# Patient Record
Sex: FEMALE | Race: WHITE | NOT HISPANIC OR LATINO | Employment: FULL TIME | RURAL
[De-identification: names, ages, dates, MRNs, and addresses within clinical notes are randomized per-mention and may not be internally consistent; named-entity substitution may affect disease eponyms.]

---

## 2018-09-13 ENCOUNTER — OFFICE VISIT (OUTPATIENT)
Dept: RETAIL CLINIC | Facility: CLINIC | Age: 23
End: 2018-09-13

## 2018-09-13 VITALS
HEIGHT: 64 IN | HEART RATE: 82 BPM | WEIGHT: 167 LBS | DIASTOLIC BLOOD PRESSURE: 84 MMHG | OXYGEN SATURATION: 97 % | TEMPERATURE: 98.2 F | SYSTOLIC BLOOD PRESSURE: 120 MMHG | BODY MASS INDEX: 28.51 KG/M2 | RESPIRATION RATE: 20 BRPM

## 2018-09-13 DIAGNOSIS — J06.9 ACUTE URI: Primary | ICD-10-CM

## 2018-09-13 LAB
EXPIRATION DATE: NORMAL
EXPIRATION DATE: NORMAL
FLUAV AG NPH QL: NEGATIVE
FLUBV AG NPH QL: NEGATIVE
INTERNAL CONTROL: NORMAL
INTERNAL CONTROL: NORMAL
Lab: NORMAL
Lab: NORMAL
S PYO AG THROAT QL: NEGATIVE

## 2018-09-13 PROCEDURE — 99213 OFFICE O/P EST LOW 20 MIN: CPT | Performed by: NURSE PRACTITIONER

## 2018-09-13 PROCEDURE — 87880 STREP A ASSAY W/OPTIC: CPT | Performed by: NURSE PRACTITIONER

## 2018-09-13 PROCEDURE — 87804 INFLUENZA ASSAY W/OPTIC: CPT | Performed by: NURSE PRACTITIONER

## 2018-09-13 NOTE — PATIENT INSTRUCTIONS
"Upper Respiratory Infection, Adult  Most upper respiratory infections (URIs) are a viral infection of the air passages leading to the lungs. A URI affects the nose, throat, and upper air passages. The most common type of URI is nasopharyngitis and is typically referred to as \"the common cold.\"  URIs run their course and usually go away on their own. Most of the time, a URI does not require medical attention, but sometimes a bacterial infection in the upper airways can follow a viral infection. This is called a secondary infection. Sinus and middle ear infections are common types of secondary upper respiratory infections.  Bacterial pneumonia can also complicate a URI. A URI can worsen asthma and chronic obstructive pulmonary disease (COPD). Sometimes, these complications can require emergency medical care and may be life threatening.  What are the causes?  Almost all URIs are caused by viruses. A virus is a type of germ and can spread from one person to another.  What increases the risk?  You may be at risk for a URI if:  · You smoke.  · You have chronic heart or lung disease.  · You have a weakened defense (immune) system.  · You are very young or very old.  · You have nasal allergies or asthma.  · You work in crowded or poorly ventilated areas.  · You work in health care facilities or schools.    What are the signs or symptoms?  Symptoms typically develop 2-3 days after you come in contact with a cold virus. Most viral URIs last 7-10 days. However, viral URIs from the influenza virus (flu virus) can last 14-18 days and are typically more severe. Symptoms may include:  · Runny or stuffy (congested) nose.  · Sneezing.  · Cough.  · Sore throat.  · Headache.  · Fatigue.  · Fever.  · Loss of appetite.  · Pain in your forehead, behind your eyes, and over your cheekbones (sinus pain).  · Muscle aches.    How is this diagnosed?  Your health care provider may diagnose a URI by:  · Physical exam.  · Tests to check that your " symptoms are not due to another condition such as:  ? Strep throat.  ? Sinusitis.  ? Pneumonia.  ? Asthma.    How is this treated?  A URI goes away on its own with time. It cannot be cured with medicines, but medicines may be prescribed or recommended to relieve symptoms. Medicines may help:  · Reduce your fever.  · Reduce your cough.  · Relieve nasal congestion.    Follow these instructions at home:  · Take medicines only as directed by your health care provider.  · Gargle warm saltwater or take cough drops to comfort your throat as directed by your health care provider.  · Use a warm mist humidifier or inhale steam from a shower to increase air moisture. This may make it easier to breathe.  · Drink enough fluid to keep your urine clear or pale yellow.  · Eat soups and other clear broths and maintain good nutrition.  · Rest as needed.  · Return to work when your temperature has returned to normal or as your health care provider advises. You may need to stay home longer to avoid infecting others. You can also use a face mask and careful hand washing to prevent spread of the virus.  · Increase the usage of your inhaler if you have asthma.  · Do not use any tobacco products, including cigarettes, chewing tobacco, or electronic cigarettes. If you need help quitting, ask your health care provider.  How is this prevented?  The best way to protect yourself from getting a cold is to practice good hygiene.  · Avoid oral or hand contact with people with cold symptoms.  · Wash your hands often if contact occurs.    There is no clear evidence that vitamin C, vitamin E, echinacea, or exercise reduces the chance of developing a cold. However, it is always recommended to get plenty of rest, exercise, and practice good nutrition.  Contact a health care provider if:  · You are getting worse rather than better.  · Your symptoms are not controlled by medicine.  · You have chills.  · You have worsening shortness of breath.  · You have  brown or red mucus.  · You have yellow or brown nasal discharge.  · You have pain in your face, especially when you bend forward.  · You have a fever.  · You have swollen neck glands.  · You have pain while swallowing.  · You have white areas in the back of your throat.  Get help right away if:  · You have severe or persistent:  ? Headache.  ? Ear pain.  ? Sinus pain.  ? Chest pain.  · You have chronic lung disease and any of the following:  ? Wheezing.  ? Prolonged cough.  ? Coughing up blood.  ? A change in your usual mucus.  · You have a stiff neck.  · You have changes in your:  ? Vision.  ? Hearing.  ? Thinking.  ? Mood.  This information is not intended to replace advice given to you by your health care provider. Make sure you discuss any questions you have with your health care provider.  Document Released: 06/13/2002 Document Revised: 08/20/2017 Document Reviewed: 03/25/2015  [employer] Interactive Patient Education © 2018 [employer]

## 2018-09-13 NOTE — PROGRESS NOTES
"Subjective   Albina Rai is a 23 y.o. female.   Chief Complaint   Patient presents with   • Fever      Fever    This is a new problem. The current episode started in the past 7 days (2 days). The problem has been waxing and waning. The maximum temperature noted was 101 to 101.9 F (today). The temperature was taken using an oral thermometer. Associated symptoms include congestion, coughing, headaches, muscle aches, nausea and a sore throat. She has tried NSAIDs for the symptoms. The treatment provided mild relief.        Albina Rai  presents to [facility] with cc of cough fever (today) and body aches that started 2 days ago. Reviewed the PMFSH. Immunizations are up to date. See ROS.  The following portions of the patient's history were reviewed and updated as appropriate: allergies, current medications, past family history, past medical history, past social history, past surgical history and problem list.    Review of Systems   Constitutional: Positive for chills and fever.   HENT: Positive for congestion, rhinorrhea (clear) and sore throat.    Respiratory: Positive for cough.    Gastrointestinal: Positive for nausea.   Neurological: Positive for headaches.       Visit Vitals  /84   Pulse 82   Temp 98.2 °F (36.8 °C) (Temporal Artery )   Resp 20   Ht 162.6 cm (64\")   Wt 75.8 kg (167 lb)   LMP 09/03/2018   SpO2 97%   BMI 28.67 kg/m²       Objective     Current Outpatient Prescriptions:   •  ibuprofen (ADVIL,MOTRIN) 800 MG tablet, Take 1 tablet by mouth Every 6 (Six) Hours As Needed for mild pain (1-3)., Disp: 30 tablet, Rfl: 0  •  Norgestim-Eth Estrad Triphasic (TRINESSA, 28, PO), Take  by mouth., Disp: , Rfl:   Allergies   Allergen Reactions   • Augmentin [Amoxicillin-Pot Clavulanate] Rash   • Erythromycin Rash       Physical Exam   Constitutional: She is oriented to person, place, and time. She appears well-developed and well-nourished. No distress.   HENT:   Head: Normocephalic and atraumatic.   Right Ear: " Tympanic membrane and external ear normal.   Left Ear: Tympanic membrane and external ear normal.   Nose: Mucosal edema present. Right sinus exhibits no maxillary sinus tenderness and no frontal sinus tenderness. Left sinus exhibits no maxillary sinus tenderness and no frontal sinus tenderness.   Mouth/Throat: Uvula is midline and mucous membranes are normal. Posterior oropharyngeal erythema present. No oropharyngeal exudate. No tonsillar exudate.   Eyes: Pupils are equal, round, and reactive to light. Conjunctivae and EOM are normal.   Neck: Normal range of motion. Neck supple.   Cardiovascular: Normal rate, regular rhythm and normal heart sounds.    Pulmonary/Chest: Effort normal and breath sounds normal. No respiratory distress.   Abdominal: Soft. Bowel sounds are normal.   Musculoskeletal: Normal range of motion.   Lymphadenopathy:     She has no cervical adenopathy.   Neurological: She is alert and oriented to person, place, and time.   Skin: Skin is warm and dry. No rash noted.   Psychiatric: She has a normal mood and affect. Her behavior is normal. Judgment and thought content normal.   Nursing note and vitals reviewed.      Lab Results (last 24 hours)     Procedure Component Value Units Date/Time    POCT rapid strep A [[id number]]  (Normal) Collected:  09/13/18 1705    Specimen:  Swab Updated:  09/13/18 1706     Rapid Strep A Screen Negative     Internal Control Passed     Lot Number [id number]     Expiration Date 1/20    POC Influenza A / B [[id number]]  (Normal) Collected:  09/13/18 1706    Specimen:  Swab Updated:  09/13/18 1707     Rapid Influenza A Ag negative     Rapid Influenza B Ag negative     Internal Control Passed     Lot Number [id number]     Expiration Date 11/8/20          Assessment/Plan   Albina was seen today for fever.    Diagnoses and all orders for this visit:    Acute URI  -     POC Influenza A / B  -     POCT rapid strep A

## 2019-02-21 ENCOUNTER — OFFICE VISIT (OUTPATIENT)
Dept: RETAIL CLINIC | Facility: CLINIC | Age: 24
End: 2019-02-21

## 2019-02-21 VITALS
HEART RATE: 82 BPM | WEIGHT: 175 LBS | BODY MASS INDEX: 30.04 KG/M2 | SYSTOLIC BLOOD PRESSURE: 118 MMHG | OXYGEN SATURATION: 99 % | TEMPERATURE: 97.8 F | RESPIRATION RATE: 16 BRPM | DIASTOLIC BLOOD PRESSURE: 80 MMHG

## 2019-02-21 DIAGNOSIS — J06.9 ACUTE UPPER RESPIRATORY INFECTION: Primary | ICD-10-CM

## 2019-02-21 DIAGNOSIS — J02.9 ACUTE PHARYNGITIS, UNSPECIFIED ETIOLOGY: ICD-10-CM

## 2019-02-21 PROCEDURE — 99213 OFFICE O/P EST LOW 20 MIN: CPT | Performed by: NURSE PRACTITIONER

## 2019-02-21 RX ORDER — CEFDINIR 300 MG/1
600 CAPSULE ORAL DAILY
Qty: 20 CAPSULE | Refills: 0 | Status: SHIPPED | OUTPATIENT
Start: 2019-02-21 | End: 2019-02-21 | Stop reason: SDUPTHER

## 2019-02-21 RX ORDER — CEFDINIR 300 MG/1
600 CAPSULE ORAL DAILY
Qty: 20 CAPSULE | Refills: 0 | Status: SHIPPED | OUTPATIENT
Start: 2019-02-21 | End: 2019-03-03

## 2019-02-21 NOTE — PROGRESS NOTES
"Subjective   Albina Rai is a 24 y.o. female.     Symptoms ongoing for several days; patient generally would not come in so soon but states \"I'm going [place] of [place] for a marriage retreat, and I'm so prone to strep and to sinus, was hoping for a scrip just in case I got worse.\"      URI    This is a new problem. The current episode started in the past 7 days. The problem has been gradually worsening. There has been no fever. Associated symptoms include congestion, headaches, sinus pain and a sore throat. Pertinent negatives include no chest pain, coughing, diarrhea, dysuria, nausea, neck pain, plugged ear sensation or vomiting. She has tried nothing for the symptoms.        The following portions of the patient's history were reviewed and updated as appropriate: allergies, current medications, past family history, past medical history, past social history, past surgical history and problem list.    Review of Systems   Constitutional: Positive for fatigue. Negative for fever.   HENT: Positive for congestion, postnasal drip, sinus pressure and sore throat.    Respiratory: Negative for cough.    Cardiovascular: Negative for chest pain.   Gastrointestinal: Negative for diarrhea, nausea and vomiting.   Genitourinary: Negative for dysuria.   Musculoskeletal: Negative for neck pain.   Neurological: Positive for headache. Negative for dizziness.       Objective      /80   Pulse 82   Temp 97.8 °F (36.6 °C) (Oral)   Resp 16   Wt 79.4 kg (175 lb)   SpO2 99%   BMI 30.04 kg/m²     Physical Exam   Constitutional: She is oriented to person, place, and time. She appears well-developed and well-nourished. No distress.   HENT:   Head: Normocephalic and atraumatic.   Right Ear: External ear normal.   Left Ear: External ear normal.   Nose: Mucosal edema present.   Mouth/Throat: Posterior oropharyngeal erythema present.       Tonsils cryptic   Eyes: Conjunctivae and EOM are normal. Pupils are equal, round, and reactive " to light.   Neck: Normal range of motion. Neck supple.   Cardiovascular: Normal rate and regular rhythm.   Pulmonary/Chest: Effort normal and breath sounds normal.   Musculoskeletal: Normal range of motion.   Neurological: She is alert and oriented to person, place, and time. No cranial nerve deficit.   Skin: Skin is warm and dry. Capillary refill takes less than 2 seconds.   Psychiatric: She has a normal mood and affect. Her behavior is normal. Judgment and thought content normal.   Nursing note and vitals reviewed.        Assessment/Plan   Albina was seen today for uri.    Diagnoses and all orders for this visit:    Acute upper respiratory infection    Acute pharyngitis, unspecified etiology    Other orders  -     Discontinue: cefdinir (OMNICEF) 300 MG capsule; Take 2 capsules by mouth Daily for 10 days.  -     cefdinir (OMNICEF) 300 MG capsule; Take 2 capsules by mouth Daily for 10 days.      Discussed that above symptoms appear viral at this time, antibiotics will not treat a viral illness.  HOWEVER, a just in case Rx was provided today. Favor conservative watchful waiting.  Patient may begin antibiotcs if symptoms persist, or worsen, as discussed.    Adequate rest and hydration, and cool mist humidifier  may be useful. Nasal saline spray may help with clearing congestion within the sinuses.   Use Claritin D and flonase for symptomatic relief.    For worsening or persistent problems, follow up with your primary care provider.     You have voiced understanding of treatment plan, visit summary provided.     PADMINI Koo

## 2019-02-21 NOTE — PATIENT INSTRUCTIONS
"Upper Respiratory Infection, Adult  Most upper respiratory infections (URIs) are a viral infection of the air passages leading to the lungs. A URI affects the nose, throat, and upper air passages. The most common type of URI is nasopharyngitis and is typically referred to as \"the common cold.\"  URIs run their course and usually go away on their own. Most of the time, a URI does not require medical attention, but sometimes a bacterial infection in the upper airways can follow a viral infection. This is called a secondary infection. Sinus and middle ear infections are common types of secondary upper respiratory infections.  Bacterial pneumonia can also complicate a URI. A URI can worsen asthma and chronic obstructive pulmonary disease (COPD). Sometimes, these complications can require emergency medical care and may be life threatening.  What are the causes?  Almost all URIs are caused by viruses. A virus is a type of germ and can spread from one person to another.  What increases the risk?  You may be at risk for a URI if:  · You smoke.  · You have chronic heart or lung disease.  · You have a weakened defense (immune) system.  · You are very young or very old.  · You have nasal allergies or asthma.  · You work in crowded or poorly ventilated areas.  · You work in health care facilities or schools.    What are the signs or symptoms?  Symptoms typically develop 2-3 days after you come in contact with a cold virus. Most viral URIs last 7-10 days. However, viral URIs from the influenza virus (flu virus) can last 14-18 days and are typically more severe. Symptoms may include:  · Runny or stuffy (congested) nose.  · Sneezing.  · Cough.  · Sore throat.  · Headache.  · Fatigue.  · Fever.  · Loss of appetite.  · Pain in your forehead, behind your eyes, and over your cheekbones (sinus pain).  · Muscle aches.    How is this diagnosed?  Your health care provider may diagnose a URI by:  · Physical exam.  · Tests to check that your " symptoms are not due to another condition such as:  ? Strep throat.  ? Sinusitis.  ? Pneumonia.  ? Asthma.    How is this treated?  A URI goes away on its own with time. It cannot be cured with medicines, but medicines may be prescribed or recommended to relieve symptoms. Medicines may help:  · Reduce your fever.  · Reduce your cough.  · Relieve nasal congestion.    Follow these instructions at home:  · Take medicines only as directed by your health care provider.  · Gargle warm saltwater or take cough drops to comfort your throat as directed by your health care provider.  · Use a warm mist humidifier or inhale steam from a shower to increase air moisture. This may make it easier to breathe.  · Drink enough fluid to keep your urine clear or pale yellow.  · Eat soups and other clear broths and maintain good nutrition.  · Rest as needed.  · Return to work when your temperature has returned to normal or as your health care provider advises. You may need to stay home longer to avoid infecting others. You can also use a face mask and careful hand washing to prevent spread of the virus.  · Increase the usage of your inhaler if you have asthma.  · Do not use any tobacco products, including cigarettes, chewing tobacco, or electronic cigarettes. If you need help quitting, ask your health care provider.  How is this prevented?  The best way to protect yourself from getting a cold is to practice good hygiene.  · Avoid oral or hand contact with people with cold symptoms.  · Wash your hands often if contact occurs.    There is no clear evidence that vitamin C, vitamin E, echinacea, or exercise reduces the chance of developing a cold. However, it is always recommended to get plenty of rest, exercise, and practice good nutrition.  Contact a health care provider if:  · You are getting worse rather than better.  · Your symptoms are not controlled by medicine.  · You have chills.  · You have worsening shortness of breath.  · You have  brown or red mucus.  · You have yellow or brown nasal discharge.  · You have pain in your face, especially when you bend forward.  · You have a fever.  · You have swollen neck glands.  · You have pain while swallowing.  · You have white areas in the back of your throat.  Get help right away if:  · You have severe or persistent:  ? Headache.  ? Ear pain.  ? Sinus pain.  ? Chest pain.  · You have chronic lung disease and any of the following:  ? Wheezing.  ? Prolonged cough.  ? Coughing up blood.  ? A change in your usual mucus.  · You have a stiff neck.  · You have changes in your:  ? Vision.  ? Hearing.  ? Thinking.  ? Mood.  This information is not intended to replace advice given to you by your health care provider. Make sure you discuss any questions you have with your health care provider.  Document Released: 06/13/2002 Document Revised: 08/20/2017 Document Reviewed: 03/25/2015  [employer] Interactive Patient Education © 2018 [employer]

## 2022-03-25 ENCOUNTER — TRANSCRIBE ORDERS (OUTPATIENT)
Dept: OBSTETRICS AND GYNECOLOGY | Facility: HOSPITAL | Age: 27
End: 2022-03-25

## 2022-03-25 DIAGNOSIS — O99.281 HYPERTHYROIDISM AFFECTING PREGNANCY IN FIRST TRIMESTER: Primary | ICD-10-CM

## 2022-03-25 DIAGNOSIS — E05.90 HYPERTHYROIDISM AFFECTING PREGNANCY IN FIRST TRIMESTER: Primary | ICD-10-CM

## 2022-03-28 ENCOUNTER — LAB (OUTPATIENT)
Dept: LAB | Facility: HOSPITAL | Age: 27
End: 2022-03-28

## 2022-03-28 ENCOUNTER — OFFICE VISIT (OUTPATIENT)
Dept: OBSTETRICS AND GYNECOLOGY | Facility: HOSPITAL | Age: 27
End: 2022-03-28

## 2022-03-28 ENCOUNTER — HOSPITAL ENCOUNTER (OUTPATIENT)
Dept: WOMENS IMAGING | Facility: HOSPITAL | Age: 27
Discharge: HOME | End: 2022-03-28

## 2022-03-28 ENCOUNTER — OFFICE VISIT (OUTPATIENT)
Dept: ENDOCRINOLOGY | Facility: CLINIC | Age: 27
End: 2022-03-28

## 2022-03-28 VITALS — BODY MASS INDEX: 31.76 KG/M2 | WEIGHT: 185 LBS | DIASTOLIC BLOOD PRESSURE: 58 MMHG | SYSTOLIC BLOOD PRESSURE: 118 MMHG

## 2022-03-28 VITALS
BODY MASS INDEX: 31.41 KG/M2 | HEIGHT: 64 IN | HEART RATE: 116 BPM | WEIGHT: 184 LBS | OXYGEN SATURATION: 97 % | SYSTOLIC BLOOD PRESSURE: 128 MMHG | DIASTOLIC BLOOD PRESSURE: 76 MMHG

## 2022-03-28 DIAGNOSIS — E05.90 HYPERTHYROIDISM AFFECTING PREGNANCY IN FIRST TRIMESTER: ICD-10-CM

## 2022-03-28 DIAGNOSIS — O99.281 HYPERTHYROIDISM IN PREGNANCY, ANTEPARTUM, FIRST TRIMESTER: Primary | ICD-10-CM

## 2022-03-28 DIAGNOSIS — O99.281 HYPERTHYROIDISM AFFECTING PREGNANCY IN FIRST TRIMESTER: ICD-10-CM

## 2022-03-28 DIAGNOSIS — E05.90 HYPERTHYROIDISM IN PREGNANCY, ANTEPARTUM, FIRST TRIMESTER: Primary | ICD-10-CM

## 2022-03-28 DIAGNOSIS — E05.90 HYPERTHYROIDISM: Primary | ICD-10-CM

## 2022-03-28 LAB
T3FREE SERPL-MCNC: 6.38 PG/ML (ref 2–4.4)
T4 FREE SERPL-MCNC: 1.77 NG/DL (ref 0.93–1.7)
TSH SERPL DL<=0.05 MIU/L-ACNC: <0.005 UIU/ML (ref 0.27–4.2)

## 2022-03-28 PROCEDURE — 83520 IMMUNOASSAY QUANT NOS NONAB: CPT | Performed by: INTERNAL MEDICINE

## 2022-03-28 PROCEDURE — 76801 OB US < 14 WKS SINGLE FETUS: CPT | Performed by: OBSTETRICS & GYNECOLOGY

## 2022-03-28 PROCEDURE — 84439 ASSAY OF FREE THYROXINE: CPT | Performed by: INTERNAL MEDICINE

## 2022-03-28 PROCEDURE — 99204 OFFICE O/P NEW MOD 45 MIN: CPT | Performed by: INTERNAL MEDICINE

## 2022-03-28 PROCEDURE — 84445 ASSAY OF TSI GLOBULIN: CPT | Performed by: INTERNAL MEDICINE

## 2022-03-28 PROCEDURE — 84481 FREE ASSAY (FT-3): CPT | Performed by: INTERNAL MEDICINE

## 2022-03-28 PROCEDURE — 86376 MICROSOMAL ANTIBODY EACH: CPT | Performed by: INTERNAL MEDICINE

## 2022-03-28 PROCEDURE — 76801 OB US < 14 WKS SINGLE FETUS: CPT

## 2022-03-28 PROCEDURE — 99204 OFFICE O/P NEW MOD 45 MIN: CPT | Performed by: OBSTETRICS & GYNECOLOGY

## 2022-03-28 PROCEDURE — 84443 ASSAY THYROID STIM HORMONE: CPT | Performed by: INTERNAL MEDICINE

## 2022-03-28 NOTE — PROGRESS NOTES
Chief Complaint   Patient presents with   • Hyperthyroidism        New patient who is being seen in consultation regarding hyperthyroidism pregnancy at the request of Melly Son*     HPI   Albina Aquino is a 27 y.o. female who presents for evaluation of hyperthyroidism in pregnancy.    Patient reports that she initially noted symptomatic concerns in December 2021 when she developed weight gain, dry skin, hair loss, increased sweating and elevated heart rate.  However, she did not have labs completed until earlier this month when she presented to the ER due to vaginal bleeding during pregnancy.  Labs did note hyperthyroidism and patient was started on propylthiouracil 50 mg twice daily approximately 2 to 3 weeks ago.  She denies any adverse effects of this medication has not had repeat labs.  She is approximately 8 weeks gestation.  She has an ultrasound scheduled with [facility] later today.  She does report symptomatic improvement since starting propylthiouracil.  No notable adverse effects.  She does report a family history of Hashimoto's disease.    Past Medical History:   Diagnosis Date   • Allergic    • Disease of thyroid gland    • History of streptococcal infection      Past Surgical History:   Procedure Laterality Date   • WISDOM TOOTH EXTRACTION        Family History   Problem Relation Age of Onset   • No Known Problems Mother    • Hypertension Father    • Polycystic ovary syndrome Sister    • Hashimoto's thyroiditis Paternal Grandmother    • Cancer Maternal Grandmother    • Breast cancer Maternal Grandmother       Social History     Socioeconomic History   • Marital status:    Tobacco Use   • Smoking status: Passive Smoke Exposure - Never Smoker   • Smokeless tobacco: Never Used   Vaping Use   • Vaping Use: Never used   Substance and Sexual Activity   • Alcohol use: No   • Drug use: No   • Sexual activity: Yes     Birth control/protection: Pill     Comment: single, student       Allergies  "  Allergen Reactions   • Erythromycin Rash      Current Outpatient Medications on File Prior to Visit   Medication Sig Dispense Refill   • Prenatal MV-Min-Fe Fum-FA-DHA (PRENATAL 1 PO)      • propylthiouracil (PTU) 50 MG tablet Take 1 tablet by mouth 2 (Two) Times a Day. 60 tablet 1   • [DISCONTINUED] ibuprofen (ADVIL,MOTRIN) 800 MG tablet Take 1 tablet by mouth Every 6 (Six) Hours As Needed for mild pain (1-3). 30 tablet 0   • [DISCONTINUED] Norgestim-Eth Estrad Triphasic (TRINESSA, 28, PO) Take  by mouth.       No current facility-administered medications on file prior to visit.        Review of Systems   Constitutional: Positive for fatigue. Negative for unexpected weight gain and unexpected weight loss.   HENT: Negative for trouble swallowing and voice change.    Eyes: Negative for photophobia and visual disturbance.   Respiratory: Negative for cough and shortness of breath.    Cardiovascular: Negative for palpitations and leg swelling.   Gastrointestinal: Negative for constipation and diarrhea.   Endocrine: Negative for cold intolerance, heat intolerance and polyuria.   Musculoskeletal: Negative for arthralgias and myalgias.   Skin: Negative for dry skin and rash.   Neurological: Negative for tremors and headache.   Psychiatric/Behavioral: Negative for sleep disturbance. The patient is not nervous/anxious.       Vitals:    03/28/22 0944   BP: 128/76   BP Location: Left arm   Patient Position: Sitting   Cuff Size: Adult   Pulse: 116   SpO2: 97%   Weight: 83.5 kg (184 lb)   Height: 162.6 cm (64\")   Body mass index is 31.58 kg/m².     Physical Exam  Vitals reviewed.   Constitutional:       General: She is not in acute distress.     Appearance: Normal appearance.   HENT:      Head: Normocephalic and atraumatic.      Right Ear: Hearing normal.      Left Ear: Hearing normal.      Nose: Nose normal.   Eyes:      General: Lids are normal.      Conjunctiva/sclera: Conjunctivae normal.   Neck:      Thyroid: No " thyromegaly or thyroid tenderness.   Cardiovascular:      Rate and Rhythm: Regular rhythm. Tachycardia present.      Heart sounds: No murmur heard.  Pulmonary:      Effort: Pulmonary effort is normal.      Breath sounds: Normal breath sounds and air entry.   Abdominal:      General: Bowel sounds are normal.      Palpations: Abdomen is soft.      Tenderness: There is no abdominal tenderness.   Lymphadenopathy:      Head:      Right side of head: No submandibular adenopathy.      Left side of head: No submandibular adenopathy.      Cervical: No cervical adenopathy.   Skin:     General: Skin is warm and dry.      Findings: No rash.   Neurological:      General: No focal deficit present.      Mental Status: She is alert.      Deep Tendon Reflexes: Reflexes are normal and symmetric.   Psychiatric:         Mood and Affect: Mood and affect normal.         Behavior: Behavior is cooperative.          Labs/Imaging  Labs dated 3/7/2022  ANC 66.4%  AST 13  ALT 25  Alkaline phosphatase 82  Free T4 1.23  TSH 0.007    Assessment and Plan    Diagnoses and all orders for this visit:    1. Hyperthyroidism in pregnancy, antepartum, first trimester (Primary)  -Diagnosed during first trimester of pregnancy when patient presented to ER due to concern for vaginal bleeding  -Now approximately 8 weeks gestation  -Patient started on propylthiouracil 50 mg twice daily approximately 2 and half weeks ago  -Patient reports symptomatic improvement with addition of medication.  -Discussed potential adverse effects of hyperthyroidism in pregnancy including fetal demise, pregnancy-induced hypertension, premature birth, low birthweight, intrauterine growth restriction, thyroid storm, maternal congestive heart failure  -Discussed that risks of hyperthyroidism in pregnancy must be weighed against risk of antithyroid drug administration.  Discussed that all antithyroid drugs cross the placenta and have potential to cause fetal abnormalities.  Discussed  with patient that, when required, propylthiouracil is the drug of choice for medical therapy prior to 16 weeks gestation.  Discussed that PTU does cross the placenta and can cause fetal anomalies including face and neck cysts, urinary tract abnormality, fetal hypothyroidism  Discussed maternal adverse effects of propylthiouracil which include liver dysfunction/failure, agranulocytosis, vasculitis, rash, hypothyroidism  Discussed goal for thyroid hormone levels during pregnancy  Plan to repeat labs today and check thyroid antibodies.  Baseline ANC and LFTs already available.  Determine next steps after review of labs  -     TSH  -     T4, Free  -     T3, Free  -     Thyrotropin Receptor Antibody  -     Thyroid Stimulating Immunoglobulin  -     Thyroid Peroxidase Antibody    Return in about 4 weeks (around 4/25/2022) for Next scheduled follow up. The patient was instructed to contact the clinic with any interval questions or concerns.    Yeimi Macias MD     Dictated Utilizing Dragon Dictation

## 2022-03-28 NOTE — PROGRESS NOTES
Patient seen in Maternal Fetal Medicine clinic today. Please see full note in under imaging tab of patient chart in Epic (Viewpoint report).    Jesenia Higuera MD

## 2022-03-29 ENCOUNTER — PATIENT ROUNDING (BHMG ONLY) (OUTPATIENT)
Dept: ENDOCRINOLOGY | Facility: CLINIC | Age: 27
End: 2022-03-29

## 2022-03-29 LAB — THYROPEROXIDASE AB SERPL-ACNC: 38 IU/ML (ref 0–34)

## 2022-03-29 NOTE — PROGRESS NOTES
A [employer] message has been sent to the patient for patient rounding with [facility]

## 2022-03-30 LAB
TSH RECEP AB SER-ACNC: 4.54 IU/L (ref 0–1.75)
TSI SER-ACNC: 1.54 IU/L (ref 0–0.55)

## 2022-03-31 RX ORDER — PROPYLTHIOURACIL 50 MG/1
50 TABLET ORAL 3 TIMES DAILY
Qty: 90 TABLET | Refills: 2 | Status: SHIPPED | OUTPATIENT
Start: 2022-03-31 | End: 2022-05-06

## 2022-05-04 ENCOUNTER — OFFICE VISIT (OUTPATIENT)
Dept: ENDOCRINOLOGY | Facility: CLINIC | Age: 27
End: 2022-05-04

## 2022-05-04 ENCOUNTER — LAB (OUTPATIENT)
Dept: LAB | Facility: HOSPITAL | Age: 27
End: 2022-05-04

## 2022-05-04 VITALS
HEART RATE: 88 BPM | SYSTOLIC BLOOD PRESSURE: 120 MMHG | WEIGHT: 191.8 LBS | DIASTOLIC BLOOD PRESSURE: 80 MMHG | BODY MASS INDEX: 32.92 KG/M2 | OXYGEN SATURATION: 99 %

## 2022-05-04 DIAGNOSIS — O99.282 HYPERTHYROIDISM IN PREGNANCY, ANTEPARTUM, SECOND TRIMESTER: Primary | ICD-10-CM

## 2022-05-04 DIAGNOSIS — E05.90 HYPERTHYROIDISM IN PREGNANCY, ANTEPARTUM, SECOND TRIMESTER: Primary | ICD-10-CM

## 2022-05-04 LAB
ALBUMIN SERPL-MCNC: 3.8 G/DL (ref 3.5–5.2)
ALBUMIN/GLOB SERPL: 1.1 G/DL
ALP SERPL-CCNC: 103 U/L (ref 39–117)
ALT SERPL W P-5'-P-CCNC: 22 U/L (ref 1–33)
ANION GAP SERPL CALCULATED.3IONS-SCNC: 11 MMOL/L (ref 5–15)
AST SERPL-CCNC: 15 U/L (ref 1–32)
BILIRUB SERPL-MCNC: <0.2 MG/DL (ref 0–1.2)
BUN SERPL-MCNC: 6 MG/DL (ref 6–20)
BUN/CREAT SERPL: 11.5 (ref 7–25)
CALCIUM SPEC-SCNC: 9.1 MG/DL (ref 8.6–10.5)
CHLORIDE SERPL-SCNC: 102 MMOL/L (ref 98–107)
CO2 SERPL-SCNC: 24 MMOL/L (ref 22–29)
CREAT SERPL-MCNC: 0.52 MG/DL (ref 0.57–1)
EGFRCR SERPLBLD CKD-EPI 2021: 132.4 ML/MIN/1.73
GLOBULIN UR ELPH-MCNC: 3.5 GM/DL
GLUCOSE SERPL-MCNC: 85 MG/DL (ref 65–99)
POTASSIUM SERPL-SCNC: 3.9 MMOL/L (ref 3.5–5.2)
PROT SERPL-MCNC: 7.3 G/DL (ref 6–8.5)
SODIUM SERPL-SCNC: 137 MMOL/L (ref 136–145)
T3FREE SERPL-MCNC: 3.22 PG/ML (ref 2–4.4)
T4 FREE SERPL-MCNC: 0.85 NG/DL (ref 0.93–1.7)
TSH SERPL DL<=0.05 MIU/L-ACNC: 0.01 UIU/ML (ref 0.27–4.2)

## 2022-05-04 PROCEDURE — 80053 COMPREHEN METABOLIC PANEL: CPT | Performed by: INTERNAL MEDICINE

## 2022-05-04 PROCEDURE — 99213 OFFICE O/P EST LOW 20 MIN: CPT | Performed by: INTERNAL MEDICINE

## 2022-05-04 PROCEDURE — 84481 FREE ASSAY (FT-3): CPT | Performed by: INTERNAL MEDICINE

## 2022-05-04 PROCEDURE — 84439 ASSAY OF FREE THYROXINE: CPT | Performed by: INTERNAL MEDICINE

## 2022-05-04 PROCEDURE — 84443 ASSAY THYROID STIM HORMONE: CPT | Performed by: INTERNAL MEDICINE

## 2022-05-04 NOTE — PROGRESS NOTES
Chief Complaint   Patient presents with   • Follow-up     Hyperthyroidism in pregnancy         HPI   Albina Aquino is a 27 y.o. female had concerns including Follow-up (Hyperthyroidism in pregnancy ).      Patient presents for follow-up of hyperthyroidism in pregnancy, she is currently taking PTU 50 mg 3 times daily.  She is approximately 14 weeks gestation.  She does report rare missed dose of medication, no side effects.  She has not had any interval labs.  She denies any weight changes,  palpitations.  She reports occasional diarrhea but states this is her baseline.  She denies any interval concerns from her OB.  She is scheduled to see PDC in 3 weeks.    The following portions of the patient's history were reviewed and updated as appropriate: allergies, current medications and past social history.    Review of Systems   Constitutional: Negative for unexpected weight gain and unexpected weight loss.   Cardiovascular: Negative for palpitations.   Gastrointestinal: Positive for diarrhea. Negative for constipation.   Endocrine: Negative for cold intolerance and heat intolerance.      /80   Pulse 88   Wt 87 kg (191 lb 12.8 oz)   SpO2 99%   BMI 32.92 kg/m²      Physical Exam      Constitutional:  well developed; well nourished  no acute distress   ENT/Thyroid: not examined   Eyes: Conjunctiva: clear   Respiratory:  breathing is unlabored  clear to auscultation bilaterally   Cardiovascular:  regular rate and rhythm   Chest:  Not performed.   Abdomen: soft, non-tender; no masses   : Not performed.   Musculoskeletal: Not performed   Skin: dry and warm   Neuro: mental status, speech normal   Psych: mood and affect are within normal limits       Labs/Imaging   Latest Reference Range & Units 03/28/22 10:30   TSH Baseline 0.270 - 4.200 uIU/mL <0.005 (L)   Free T4 0.93 - 1.70 ng/dL 1.77 (H)   T3, Free 2.00 - 4.40 pg/mL 6.38 (H)   Thyroid Peroxidase Antibody 0 - 34 IU/mL 38 (H)   Thyroid Stimulating  Immunoglobulin 0.00 - 0.55 IU/L 1.54 (H)   Thyrotropin Receptor Antibody 0.00 - 1.75 IU/L 4.54 (H)   (L): Data is abnormally low  (H): Data is abnormally high    Diagnoses and all orders for this visit:    1. Hyperthyroidism in pregnancy, antepartum, second trimester (Primary)  -Hyperthyroidism secondary to Graves' disease  - currently 14 weeks gestation  -Propylthiouracil increased to 50 mg 3 times daily following last visit, patient due for repeat labs  -Discussed likely transition to methimazole at next visit.  -Plan to repeat labs today and adjust medications as clinically indicated  -Reviewed maternal/fetal risks of propylthiouracil use as well as risk of hyperthyroidism.  -     TSH  -     T4, Free  -     T3, Free  -     Comprehensive Metabolic Panel    Return in about 4 weeks (around 6/1/2022) for Next scheduled follow up. The patient was instructed to contact the clinic with any interval questions or concerns.    Yeimi Macias MD   Endocrinologist    Dictated Utilizing Dragon Dictation

## 2022-05-06 ENCOUNTER — TELEPHONE (OUTPATIENT)
Dept: ENDOCRINOLOGY | Facility: CLINIC | Age: 27
End: 2022-05-06

## 2022-05-06 RX ORDER — PROPYLTHIOURACIL 50 MG/1
50 TABLET ORAL 2 TIMES DAILY
Qty: 60 TABLET | Refills: 2 | Status: SHIPPED | OUTPATIENT
Start: 2022-05-06 | End: 2022-05-26

## 2022-05-06 NOTE — TELEPHONE ENCOUNTER
Message to the pt. She verbalized understanding. She was transferred to the front to schedule appt.

## 2022-05-23 ENCOUNTER — APPOINTMENT (OUTPATIENT)
Dept: WOMENS IMAGING | Facility: HOSPITAL | Age: 27
End: 2022-05-23

## 2022-05-24 ENCOUNTER — OFFICE VISIT (OUTPATIENT)
Dept: OBSTETRICS AND GYNECOLOGY | Facility: HOSPITAL | Age: 27
End: 2022-05-24

## 2022-05-24 ENCOUNTER — OFFICE VISIT (OUTPATIENT)
Dept: ENDOCRINOLOGY | Facility: CLINIC | Age: 27
End: 2022-05-24

## 2022-05-24 ENCOUNTER — HOSPITAL ENCOUNTER (OUTPATIENT)
Dept: WOMENS IMAGING | Facility: HOSPITAL | Age: 27
Discharge: HOME | End: 2022-05-24

## 2022-05-24 ENCOUNTER — LAB (OUTPATIENT)
Dept: LAB | Facility: HOSPITAL | Age: 27
End: 2022-05-24

## 2022-05-24 VITALS
DIASTOLIC BLOOD PRESSURE: 84 MMHG | SYSTOLIC BLOOD PRESSURE: 148 MMHG | HEIGHT: 64 IN | HEART RATE: 90 BPM | OXYGEN SATURATION: 99 % | WEIGHT: 195 LBS | BODY MASS INDEX: 33.29 KG/M2

## 2022-05-24 VITALS — SYSTOLIC BLOOD PRESSURE: 157 MMHG | DIASTOLIC BLOOD PRESSURE: 78 MMHG | BODY MASS INDEX: 33.47 KG/M2 | WEIGHT: 195 LBS

## 2022-05-24 DIAGNOSIS — E05.90 HYPERTHYROIDISM: Primary | ICD-10-CM

## 2022-05-24 DIAGNOSIS — E05.90 HYPERTHYROIDISM: ICD-10-CM

## 2022-05-24 DIAGNOSIS — O99.282 HYPERTHYROIDISM IN PREGNANCY, ANTEPARTUM, SECOND TRIMESTER: Primary | ICD-10-CM

## 2022-05-24 DIAGNOSIS — E05.90 HYPERTHYROIDISM IN PREGNANCY, ANTEPARTUM, SECOND TRIMESTER: Primary | ICD-10-CM

## 2022-05-24 PROCEDURE — 84443 ASSAY THYROID STIM HORMONE: CPT | Performed by: INTERNAL MEDICINE

## 2022-05-24 PROCEDURE — 76811 OB US DETAILED SNGL FETUS: CPT

## 2022-05-24 PROCEDURE — 76811 OB US DETAILED SNGL FETUS: CPT | Performed by: OBSTETRICS & GYNECOLOGY

## 2022-05-24 PROCEDURE — 99214 OFFICE O/P EST MOD 30 MIN: CPT | Performed by: OBSTETRICS & GYNECOLOGY

## 2022-05-24 PROCEDURE — 99214 OFFICE O/P EST MOD 30 MIN: CPT | Performed by: INTERNAL MEDICINE

## 2022-05-24 PROCEDURE — 84481 FREE ASSAY (FT-3): CPT | Performed by: INTERNAL MEDICINE

## 2022-05-24 PROCEDURE — 84439 ASSAY OF FREE THYROXINE: CPT | Performed by: INTERNAL MEDICINE

## 2022-05-24 NOTE — PROGRESS NOTES
"Chief Complaint   Patient presents with   • Hyperthyroidism        HPI   Albina Aquino is a 27 y.o. female had concerns including Hyperthyroidism.     Patient presents for follow-up of hyperthyroidism in pregnancy, now approximately 17 weeks gestation.  She is a high risk OB earlier today and had an ultrasound.  Patient is currently taking propylthiouracil 50 mg twice daily.  This was reduced after her most recent appointment.  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  She reports she thinks he is doing well with staying hydrated.  She denies any palpitations, concerning weight changes.  She reports intermittent diarrhea but states this is unchanged from prior.  She does report that OB raised concern regarding elevated blood pressure, she is to complete 24-hour urine. She reports that OB is aware of headache.    The following portions of the patient's history were reviewed and updated as appropriate: allergies, current medications and past social history.    Review of Systems   Gastrointestinal: Positive for diarrhea. Negative for constipation.   Endocrine: Negative for cold intolerance and heat intolerance.   Neurological: Positive for headache.       /84 (BP Location: Right arm, Patient Position: Sitting, Cuff Size: Adult)   Pulse 90   Ht 162.6 cm (64\")   Wt 88.5 kg (195 lb)   SpO2 99%   BMI 33.47 kg/m²      Physical Exam      Constitutional:  well developed; well nourished  no acute distress   ENT/Thyroid: no thyromegaly   Eyes: Conjunctiva: clear   Respiratory:  breathing is unlabored  clear to auscultation bilaterally   Cardiovascular:  regular rate and rhythm   Chest:  Not performed.   Abdomen: soft, non-tender; no masses   : Not performed.   Musculoskeletal: Not performed   Skin: not performed.   Neuro: mental status, speech normal   Psych: mood and affect are within normal limits       Labs/Imaging   Latest Reference Range & " Units 03/28/22 10:30 05/04/22 11:14   TSH Baseline 0.270 - 4.200 uIU/mL <0.005 (L) 0.008 (L)   Free T4 0.93 - 1.70 ng/dL 1.77 (H) 0.85 (L)   T3, Free 2.00 - 4.40 pg/mL 6.38 (H) 3.22   Thyroid Peroxidase Antibody 0 - 34 IU/mL 38 (H)    Thyroid Stimulating Immunoglobulin 0.00 - 0.55 IU/L 1.54 (H)    Thyrotropin Receptor Antibody 0.00 - 1.75 IU/L 4.54 (H)    (L): Data is abnormally low  (H): Data is abnormally high    Diagnoses and all orders for this visit:    1. Hyperthyroidism in pregnancy, antepartum, second trimester (Primary)  -Now approximately 17 weeks gestation  -Currently taking propylthiouracil 50 mg twice daily, dose reduced last visit  -Plan to repeat labs today, will plan for transition to methimazole, dose pending labs  -Reviewed potential adverse effects of methimazole use in pregnancy.  We did discuss that this is known to cross the placenta.  Discussed that methimazole can cause choanal or esophageal atresia, abdominal wall defects, defects of the ocular system, urinary system or ventricular septum.   Reviewed potential adverse effects of hyperthyroidism in pregnancy  Reviewed potential maternal adverse effects of methimazole use  -     TSH  -     T4, Free  -     T3, Free      Return in about 4 weeks (around 6/21/2022) for Next scheduled follow up. The patient was instructed to contact the clinic with any interval questions or concerns.    Yeimi Macias MD   Endocrinologist    Dictated Utilizing Dragon Dictation

## 2022-05-25 LAB
T3FREE SERPL-MCNC: 2.87 PG/ML (ref 2–4.4)
T4 FREE SERPL-MCNC: 0.73 NG/DL (ref 0.93–1.7)
TSH SERPL DL<=0.05 MIU/L-ACNC: 0.11 UIU/ML (ref 0.27–4.2)

## 2022-05-26 ENCOUNTER — TELEPHONE (OUTPATIENT)
Dept: ENDOCRINOLOGY | Facility: CLINIC | Age: 27
End: 2022-05-26

## 2022-05-26 ENCOUNTER — TELEPHONE (OUTPATIENT)
Dept: WOMENS IMAGING | Facility: HOSPITAL | Age: 27
End: 2022-05-26

## 2022-05-26 DIAGNOSIS — E05.90 HYPERTHYROIDISM IN PREGNANCY, ANTEPARTUM, SECOND TRIMESTER: Primary | ICD-10-CM

## 2022-05-26 DIAGNOSIS — O99.282 HYPERTHYROIDISM IN PREGNANCY, ANTEPARTUM, SECOND TRIMESTER: Primary | ICD-10-CM

## 2022-05-26 NOTE — TELEPHONE ENCOUNTER
Called pt to give her the fax number to [facility] for the lab in [place] in order to get 24 hour urine results sent to Dr Higuera. Pt also going to call and verify with the hospital lab in [place] that she has the correct urine collection jug they need in order to run a 24 hour urine. Pt v/u.

## 2022-06-03 ENCOUNTER — TELEPHONE (OUTPATIENT)
Dept: ENDOCRINOLOGY | Facility: CLINIC | Age: 27
End: 2022-06-03

## 2022-06-03 NOTE — TELEPHONE ENCOUNTER
Please contact patient, I received results of labs dated 6/2/2022  TSH remains low but free T4 is within normal range, I have not yet received results of free T3.  Based on these results, I would like her to remain off medication for now.  We will repeat labs at follow-up, she should contact the clinic in the interim with any concerning changes

## 2022-06-06 NOTE — TELEPHONE ENCOUNTER
Called the pt and read her the message. She said they did not do a T3. She was transferred to the front to schedule appt for 6-27-22 due to OB appt that day at 230 PM

## 2022-06-22 ENCOUNTER — APPOINTMENT (OUTPATIENT)
Dept: WOMENS IMAGING | Facility: HOSPITAL | Age: 27
End: 2022-06-22

## 2022-06-27 ENCOUNTER — LAB (OUTPATIENT)
Dept: LAB | Facility: HOSPITAL | Age: 27
End: 2022-06-27

## 2022-06-27 ENCOUNTER — OFFICE VISIT (OUTPATIENT)
Dept: ENDOCRINOLOGY | Facility: CLINIC | Age: 27
End: 2022-06-27

## 2022-06-27 ENCOUNTER — HOSPITAL ENCOUNTER (OUTPATIENT)
Dept: WOMENS IMAGING | Facility: HOSPITAL | Age: 27
Discharge: HOME | End: 2022-06-27

## 2022-06-27 ENCOUNTER — OFFICE VISIT (OUTPATIENT)
Dept: OBSTETRICS AND GYNECOLOGY | Facility: HOSPITAL | Age: 27
End: 2022-06-27

## 2022-06-27 VITALS
DIASTOLIC BLOOD PRESSURE: 80 MMHG | HEIGHT: 64 IN | SYSTOLIC BLOOD PRESSURE: 144 MMHG | BODY MASS INDEX: 34.15 KG/M2 | WEIGHT: 200 LBS | HEART RATE: 84 BPM | OXYGEN SATURATION: 99 %

## 2022-06-27 VITALS — SYSTOLIC BLOOD PRESSURE: 118 MMHG | BODY MASS INDEX: 34.5 KG/M2 | DIASTOLIC BLOOD PRESSURE: 67 MMHG | WEIGHT: 201 LBS

## 2022-06-27 DIAGNOSIS — E05.90 HYPERTHYROIDISM IN PREGNANCY, ANTEPARTUM, SECOND TRIMESTER: Primary | ICD-10-CM

## 2022-06-27 DIAGNOSIS — Z34.90 PREGNANCY, UNSPECIFIED GESTATIONAL AGE: Primary | ICD-10-CM

## 2022-06-27 DIAGNOSIS — E05.90 HYPERTHYROIDISM: ICD-10-CM

## 2022-06-27 DIAGNOSIS — O99.282 HYPERTHYROIDISM IN PREGNANCY, ANTEPARTUM, SECOND TRIMESTER: Primary | ICD-10-CM

## 2022-06-27 PROBLEM — E07.9 DISEASE OF THYROID GLAND: Status: ACTIVE | Noted: 2022-05-24

## 2022-06-27 LAB
T3FREE SERPL-MCNC: 3.93 PG/ML (ref 2–4.4)
T4 FREE SERPL-MCNC: 1.2 NG/DL (ref 0.93–1.7)
TSH SERPL DL<=0.05 MIU/L-ACNC: 0.01 UIU/ML (ref 0.27–4.2)

## 2022-06-27 PROCEDURE — 84443 ASSAY THYROID STIM HORMONE: CPT | Performed by: INTERNAL MEDICINE

## 2022-06-27 PROCEDURE — 84439 ASSAY OF FREE THYROXINE: CPT | Performed by: INTERNAL MEDICINE

## 2022-06-27 PROCEDURE — 99213 OFFICE O/P EST LOW 20 MIN: CPT | Performed by: INTERNAL MEDICINE

## 2022-06-27 PROCEDURE — 76816 OB US FOLLOW-UP PER FETUS: CPT | Performed by: OBSTETRICS & GYNECOLOGY

## 2022-06-27 PROCEDURE — 76816 OB US FOLLOW-UP PER FETUS: CPT

## 2022-06-27 PROCEDURE — 84481 FREE ASSAY (FT-3): CPT | Performed by: INTERNAL MEDICINE

## 2022-06-27 NOTE — PROGRESS NOTES
"Chief Complaint   Patient presents with   • Hypothyroidism     pregnant        HPI   Albina Aquino is a 27 y.o. female had concerns including Hypothyroidism (pregnant).      Patient is now approximately 22 weeks gestation.  She denies any symptomatic changes since discontinuation of propylthiouracil.  Specifically, she denies any weight loss, diarrhea, palpitations.  She has follow-up with PDC later today.    The following portions of the patient's history were reviewed and updated as appropriate: allergies, current medications and past social history.    Review of Systems   Cardiovascular: Negative for palpitations.   Gastrointestinal: Negative for constipation and diarrhea.   Endocrine: Negative for cold intolerance and heat intolerance.        /80 (BP Location: Right arm, Patient Position: Sitting, Cuff Size: Adult)   Pulse 84   Ht 162.6 cm (64\")   Wt 90.7 kg (200 lb)   SpO2 99%   BMI 34.33 kg/m²      Physical Exam      Constitutional:  well developed; well nourished  no acute distress  appears stated age   ENT/Thyroid: not examined   Eyes: Conjunctiva: clear   Respiratory:  breathing is unlabored  clear to auscultation bilaterally   Cardiovascular:  regular rate and rhythm   Chest:  Not performed.   Abdomen: gravid   : Not performed.   Musculoskeletal: Not performed   Skin: dry and warm   Neuro: mental status, speech normal   Psych: mood and affect are within normal limits       Labs/Imaging  Labs dated 6/2/2022  TSH 0.030  Free T4 0.99     Latest Reference Range & Units 03/28/22 10:30 05/04/22 11:14 05/24/22 15:25   TSH Baseline 0.270 - 4.200 uIU/mL <0.005 (L) 0.008 (L) 0.109 (L)   Free T4 0.93 - 1.70 ng/dL 1.77 (H) 0.85 (L) 0.73 (L)   T3, Free 2.00 - 4.40 pg/mL 6.38 (H) 3.22 2.87   Thyroid Peroxidase Antibody 0 - 34 IU/mL 38 (H)     Thyroid Stimulating Immunoglobulin 0.00 - 0.55 IU/L 1.54 (H)     Thyrotropin Receptor Antibody 0.00 - 1.75 IU/L 4.54 (H)     (L): Data is abnormally low  (H): Data " is abnormally high    Diagnoses and all orders for this visit:    1. Hyperthyroidism in pregnancy, antepartum, second trimester (Primary)  -Now approximately 22 weeks gestation  -Propylthiouracil was discontinued in May 2022 due to continued low TSH on minimal dose.  -Repeat labs 1 week following discontinuation showed normal free T4, continued low TSH.  Discussed goals for thyroid function in pregnancy.    -Patient is clinically euthyroid  -Plan to repeat thyroid function testing today and determine next steps, if antithyroid drugs are needed at this point, with use of methimazole.  Reviewed adverse effects of methimazole in pregnancy as well as potential risks of hyperthyroidism in pregnancy.  Discussed that if free thyroid hormones were normal, the risk of initiating methimazole weekly outweighs benefit.  -     TSH  -     T4, Free  -     T3, Free      Return in about 8 weeks (around 8/22/2022) for Next scheduled follow up. The patient was instructed to contact the clinic with any interval questions or concerns.    Yeimi Macias MD   Endocrinologist    Dictated Utilizing Dragon Dictation

## 2022-06-27 NOTE — PROGRESS NOTES
Documentation of the ultasound findings, images, and interpretations will be available in the patient's Viewpoint report located in the Chart Review Imaging tab in [facility].

## 2022-08-03 ENCOUNTER — TELEPHONE (OUTPATIENT)
Dept: ENDOCRINOLOGY | Facility: CLINIC | Age: 27
End: 2022-08-03

## 2022-08-17 NOTE — TELEPHONE ENCOUNTER
Left message for [facility] to fax lab results. Fax number was also left.    Called the pt and told her I had left 2 messages on a voicemail for the labs. She is going to call them today about a glucose test she took on Thursday. I provided her with fax number to have them faxed to.

## 2022-08-18 NOTE — TELEPHONE ENCOUNTER
Pt called and I told her I received a paper via fax stated labs were not signed off on. She is going to call and find out what was going on.

## 2022-08-30 ENCOUNTER — HOSPITAL ENCOUNTER (OUTPATIENT)
Dept: HOSPITAL 79 - GENOP | Age: 27
Discharge: OTHER INSTITUTION | End: 2022-08-30
Attending: OBSTETRICS & GYNECOLOGY
Payer: COMMERCIAL

## 2022-08-30 DIAGNOSIS — Z3A.30: ICD-10-CM

## 2022-08-30 DIAGNOSIS — O16.3: Primary | ICD-10-CM

## 2022-08-30 LAB
BUN/CREATININE RATIO: 19 (ref 0–10)
HGB BLD-MCNC: 11 GM/DL (ref 12.3–15.3)
RED BLOOD COUNT: 3.58 M/UL (ref 4–5.1)
WHITE BLOOD COUNT: 10.5 K/UL (ref 4.5–11)

## 2022-09-01 ENCOUNTER — TELEPHONE (OUTPATIENT)
Dept: ENDOCRINOLOGY | Facility: CLINIC | Age: 27
End: 2022-09-01

## 2022-09-01 NOTE — TELEPHONE ENCOUNTER
Please contact patient, I received results of labs dated 8/11/2022  TSH 0.061  Free T4 1.09  Total T3 226    TSH remains low.  Free T4 is normal.  Total T3 is elevated but within expected range for pregnancy.  Overall, laboratory evaluation is stable from prior and I would not recommend intervention.  She should continue to have monitoring throughout the remainder pregnancy.  I recommend repeat labs in approximately 6 weeks.  Of note, she canceled most recent follow-up appointment, please aid in rescheduling.

## 2022-09-02 NOTE — TELEPHONE ENCOUNTER
Called the pt and she is in [facility]. Baby is in NICU. She stated that [facility] had redrawn her labs and was told they were within normal range.    Pt is aware that Dr Macias is out of the office.    Please advise

## 2022-09-02 NOTE — TELEPHONE ENCOUNTER
Patient called stating that she is currently at [facility] and she has delivered the baby. She did mention that they are currently monitoring her thyroid levels there. Please advise.

## 2022-09-08 NOTE — TELEPHONE ENCOUNTER
Review thyroid function testing dated 9/2/2022  Free T4 0.7  Total T3 133  TSH 2.65    Free T4 is slightly low but remainder of thyroid function testing is normal.  No immediate action is required.  I recommend repeat testing in 6 to 8 weeks unless new concerns arise.

## 2022-11-21 ENCOUNTER — TELEPHONE (OUTPATIENT)
Dept: ENDOCRINOLOGY | Facility: CLINIC | Age: 27
End: 2022-11-21

## 2022-11-21 NOTE — TELEPHONE ENCOUNTER
Pt called to cancel her appt for this Wednesday. Her  has an appt at the same time. Pt wanted to see if she could come later in the day Wednesday. Please call her to overbook. She just had her baby.    thanks      [phone]

## 2023-01-11 ENCOUNTER — LAB (OUTPATIENT)
Dept: LAB | Facility: HOSPITAL | Age: 28
End: 2023-01-11
Payer: COMMERCIAL

## 2023-01-11 ENCOUNTER — OFFICE VISIT (OUTPATIENT)
Dept: ENDOCRINOLOGY | Facility: CLINIC | Age: 28
End: 2023-01-11
Payer: COMMERCIAL

## 2023-01-11 VITALS
BODY MASS INDEX: 35.2 KG/M2 | HEART RATE: 132 BPM | DIASTOLIC BLOOD PRESSURE: 86 MMHG | HEIGHT: 64 IN | SYSTOLIC BLOOD PRESSURE: 126 MMHG | WEIGHT: 206.2 LBS | OXYGEN SATURATION: 97 %

## 2023-01-11 DIAGNOSIS — E05.90 HYPERTHYROIDISM: Primary | ICD-10-CM

## 2023-01-11 PROCEDURE — 84439 ASSAY OF FREE THYROXINE: CPT | Performed by: NURSE PRACTITIONER

## 2023-01-11 PROCEDURE — 84481 FREE ASSAY (FT-3): CPT | Performed by: NURSE PRACTITIONER

## 2023-01-11 PROCEDURE — 99213 OFFICE O/P EST LOW 20 MIN: CPT | Performed by: NURSE PRACTITIONER

## 2023-01-11 PROCEDURE — 84443 ASSAY THYROID STIM HORMONE: CPT | Performed by: NURSE PRACTITIONER

## 2023-01-11 PROCEDURE — 36415 COLL VENOUS BLD VENIPUNCTURE: CPT | Performed by: NURSE PRACTITIONER

## 2023-01-11 PROCEDURE — 80053 COMPREHEN METABOLIC PANEL: CPT | Performed by: NURSE PRACTITIONER

## 2023-01-11 NOTE — PROGRESS NOTES
"Chief Complaint   Patient presents with   • Hyperthyroidism     F/u        HPI   Albina Aquino is a 27 y.o. female had concerns including Hyperthyroidism (F/u).    Hyperthyroidism.    She was noted to have hyperthyroidism during her pregnancy.  She was placed on PTU for some time.  This was stopped during her pregnancy due to her Free T4 being in range with a minimal low TSH.    She had her baby in August at .  She was having pruritis during pregnancy that has now resolved since having her baby.  She has not been on any thyroid medication since delivering in August. She is having increased forgetfulness and dizziness as well.    Denies any compressive s/sx's. She has not had any labs since her delivery.  She delivered at 31 weeks at  due to HELPP syndrome and pre-eclampsia.   She is having symptoms that she feels are related to hyperthyroidism.  Patient is currently breastfeeding.    The following portions of the patient's history were reviewed and updated as appropriate: allergies, current medications, past family history, past medical history, past social history, past surgical history and problem list.    Review of Systems   Constitutional: Positive for fatigue. Negative for unexpected weight gain and unexpected weight loss.   Eyes: Negative.    Cardiovascular: Positive for palpitations.   Endocrine: Positive for heat intolerance.   Neurological: Positive for dizziness and memory problem. Negative for tremors.   Psychiatric/Behavioral: Positive for sleep disturbance.   All other systems reviewed and are negative.    /86 (BP Location: Left arm, Patient Position: Sitting, Cuff Size: Adult)   Pulse (!) 132   Ht 162.6 cm (64\")   Wt 93.5 kg (206 lb 3.2 oz)   SpO2 97%   Breastfeeding Yes   BMI 35.39 kg/m²      Physical Exam  Vitals reviewed.   Constitutional:       Appearance: Normal appearance.   Eyes:      Extraocular Movements: Extraocular movements intact.   Neck:      Thyroid: Thyromegaly and " thyroid tenderness present. No thyroid mass.   Cardiovascular:      Rate and Rhythm: Tachycardia present.   Pulmonary:      Effort: Pulmonary effort is normal.   Skin:     General: Skin is warm.   Neurological:      General: No focal deficit present.      Mental Status: She is alert and oriented to person, place, and time.      Comments: No tremor noted.   Psychiatric:         Mood and Affect: Mood normal.         Behavior: Behavior normal.         Thought Content: Thought content normal.         Judgment: Judgment normal.        CMP  Lab Results   Component Value Date    GLUCOSE 85 05/04/2022    BUN 6 05/04/2022    CREATININE 0.52 (L) 05/04/2022    BCR 11.5 05/04/2022    K 3.9 09/02/2022    CO2 24.0 05/04/2022    CALCIUM 9.1 05/04/2022    ALBUMIN 3.80 05/04/2022    AST 15 05/04/2022    ALT 22 05/04/2022        CBC w/DIFF   Lab Results   Component Value Date    WBC 13.10 (H) 09/04/2022    RBC 3.05 (L) 09/04/2022    HGB 9.2 (L) 09/04/2022    HCT 28.1 (L) 09/04/2022    MCV 92 09/04/2022    MCH 30.2 09/04/2022    MCHC 32.7 09/04/2022    RDW 14.7 (H) 09/04/2022    MPV 10.5 09/04/2022     09/04/2022    NRBC 0.0 09/04/2022       TSH  Lab Results   Component Value Date    TSH 0.010 (L) 06/27/2022    TSH 0.109 (L) 05/24/2022    TSH 0.008 (L) 05/04/2022       T4  Lab Results   Component Value Date    FREET4 0.7 (L) 09/02/2022    FREET4 1.20 06/27/2022    FREET4 0.73 (L) 05/24/2022     No results found for: K5GXGLU    T3  Lab Results   Component Value Date    T3FREE 3.93 06/27/2022    T3FREE 2.87 05/24/2022    T3FREE 3.22 05/04/2022     Lab Results   Component Value Date    G8NTAVU 133 09/02/2022       TRAb  Lab Results   Component Value Date    TSURCPAB 4.54 (H) 03/28/2022       TPO  Lab Results   Component Value Date    THYROIDAB 38 (H) 03/28/2022       Assessment and Plan    Diagnoses and all orders for this visit:    1. Hyperthyroidism (Primary)  Assessment & Plan:  -Clinically hyperthyroid.  -TFT's today with  medication initiation as indicated.  -Follow-up in 6 months or sooner if indicated.      Orders:  -     TSH  -     T4, Free  -     T3, Free  -     Comprehensive Metabolic Panel       Return in about 6 months (around 7/11/2023) for Follow-up appointment. The patient was instructed to contact the clinic with any interval questions or concerns.    This document has been electronically signed by PADMINI Walker  January 11, 2023 16:20 EST  Endocrinology

## 2023-01-11 NOTE — ASSESSMENT & PLAN NOTE
-Clinically hyperthyroid.  -TFT's today with medication initiation as indicated.  -Follow-up in 6 months or sooner if indicated.

## 2023-01-12 LAB
ALBUMIN SERPL-MCNC: 4.4 G/DL (ref 3.5–5.2)
ALBUMIN/GLOB SERPL: 1.4 G/DL
ALP SERPL-CCNC: 109 U/L (ref 39–117)
ALT SERPL W P-5'-P-CCNC: 51 U/L (ref 1–33)
ANION GAP SERPL CALCULATED.3IONS-SCNC: 8.9 MMOL/L (ref 5–15)
AST SERPL-CCNC: 26 U/L (ref 1–32)
BILIRUB SERPL-MCNC: <0.2 MG/DL (ref 0–1.2)
BUN SERPL-MCNC: 13 MG/DL (ref 6–20)
BUN/CREAT SERPL: 24.5 (ref 7–25)
CALCIUM SPEC-SCNC: 9.8 MG/DL (ref 8.6–10.5)
CHLORIDE SERPL-SCNC: 103 MMOL/L (ref 98–107)
CO2 SERPL-SCNC: 27.1 MMOL/L (ref 22–29)
CREAT SERPL-MCNC: 0.53 MG/DL (ref 0.57–1)
EGFRCR SERPLBLD CKD-EPI 2021: 131.8 ML/MIN/1.73
GLOBULIN UR ELPH-MCNC: 3.1 GM/DL
GLUCOSE SERPL-MCNC: 96 MG/DL (ref 65–99)
POTASSIUM SERPL-SCNC: 4.3 MMOL/L (ref 3.5–5.2)
PROT SERPL-MCNC: 7.5 G/DL (ref 6–8.5)
SODIUM SERPL-SCNC: 139 MMOL/L (ref 136–145)
T3FREE SERPL-MCNC: 16.6 PG/ML (ref 2–4.4)
T4 FREE SERPL-MCNC: 5.43 NG/DL (ref 0.93–1.7)
TSH SERPL DL<=0.05 MIU/L-ACNC: <0.005 UIU/ML (ref 0.27–4.2)

## 2023-01-12 RX ORDER — METHIMAZOLE 10 MG/1
10 TABLET ORAL DAILY
Qty: 90 TABLET | Refills: 2 | Status: SHIPPED | OUTPATIENT
Start: 2023-01-12 | End: 2023-02-01

## 2023-01-12 NOTE — PROGRESS NOTES
Spoke with pt. She is wanting to start taking the methimazole. She wants it sent in to [facility] in [place]

## 2023-02-01 RX ORDER — PROPYLTHIOURACIL 50 MG/1
50 TABLET ORAL 2 TIMES DAILY
Qty: 60 TABLET | Refills: 2 | Status: SHIPPED | OUTPATIENT
Start: 2023-02-01 | End: 2024-02-01

## 2023-06-07 ENCOUNTER — OFFICE VISIT (OUTPATIENT)
Dept: ENDOCRINOLOGY | Facility: CLINIC | Age: 28
End: 2023-06-07
Payer: COMMERCIAL

## 2023-06-07 VITALS
BODY MASS INDEX: 32.68 KG/M2 | SYSTOLIC BLOOD PRESSURE: 128 MMHG | OXYGEN SATURATION: 94 % | HEIGHT: 64 IN | WEIGHT: 191.4 LBS | DIASTOLIC BLOOD PRESSURE: 84 MMHG | HEART RATE: 108 BPM

## 2023-06-07 DIAGNOSIS — E05.90 HYPERTHYROIDISM: Primary | ICD-10-CM

## 2023-06-07 LAB
ALBUMIN SERPL-MCNC: 4.2 G/DL (ref 3.5–5.2)
ALBUMIN/GLOB SERPL: 1.3 G/DL
ALP SERPL-CCNC: 154 U/L (ref 39–117)
ALT SERPL W P-5'-P-CCNC: 22 U/L (ref 1–33)
ANION GAP SERPL CALCULATED.3IONS-SCNC: 11.6 MMOL/L (ref 5–15)
AST SERPL-CCNC: 16 U/L (ref 1–32)
BILIRUB SERPL-MCNC: 0.2 MG/DL (ref 0–1.2)
BUN SERPL-MCNC: 9 MG/DL (ref 6–20)
BUN/CREAT SERPL: 16.1 (ref 7–25)
CALCIUM SPEC-SCNC: 9.4 MG/DL (ref 8.6–10.5)
CHLORIDE SERPL-SCNC: 102 MMOL/L (ref 98–107)
CO2 SERPL-SCNC: 26.4 MMOL/L (ref 22–29)
CREAT SERPL-MCNC: 0.56 MG/DL (ref 0.57–1)
EGFRCR SERPLBLD CKD-EPI 2021: 129.3 ML/MIN/1.73
GLOBULIN UR ELPH-MCNC: 3.2 GM/DL
GLUCOSE SERPL-MCNC: 94 MG/DL (ref 65–99)
POTASSIUM SERPL-SCNC: 4 MMOL/L (ref 3.5–5.2)
PROT SERPL-MCNC: 7.4 G/DL (ref 6–8.5)
SODIUM SERPL-SCNC: 140 MMOL/L (ref 136–145)
T4 FREE SERPL-MCNC: 1.91 NG/DL (ref 0.93–1.7)
TSH SERPL DL<=0.05 MIU/L-ACNC: <0.005 UIU/ML (ref 0.27–4.2)

## 2023-06-07 PROCEDURE — 84439 ASSAY OF FREE THYROXINE: CPT | Performed by: NURSE PRACTITIONER

## 2023-06-07 PROCEDURE — 84481 FREE ASSAY (FT-3): CPT | Performed by: NURSE PRACTITIONER

## 2023-06-07 PROCEDURE — 84443 ASSAY THYROID STIM HORMONE: CPT | Performed by: NURSE PRACTITIONER

## 2023-06-07 PROCEDURE — 80053 COMPREHEN METABOLIC PANEL: CPT | Performed by: NURSE PRACTITIONER

## 2023-06-07 RX ORDER — PRENATAL VIT NO.126/IRON/FOLIC 28MG-0.8MG
1 TABLET ORAL DAILY
COMMUNITY

## 2023-06-07 NOTE — PROGRESS NOTES
"Chief Complaint   Patient presents with    Hypothyroidism        HPI   Albina Aquino is a 28 y.o. female had concerns including Hypothyroidism.    Hyperthyroidism.    She reports that she is taking PTU 50 mg BID.  She has been taking this regularly without any issues and without missing any doses.  She denies any changes today.    History:  She was noted to have hyperthyroidism during her pregnancy.  She was placed on PTU for some time.  This was stopped during her pregnancy due to her Free T4 being in range with a minimal low TSH.    She had her baby in August at .  She was having pruritis during pregnancy that has now resolved since having her baby.  She has not been on any thyroid medication since delivering in August. She is having increased forgetfulness and dizziness as well.    Denies any compressive s/sx's. She has not had any labs since her delivery.  She delivered at 31 weeks at  due to HELPP syndrome and pre-eclampsia.   She is having symptoms that she feels are related to hyperthyroidism.  Patient is currently breastfeeding.    The following portions of the patient's history were reviewed and updated as appropriate: allergies, current medications, past family history, past medical history, past social history, past surgical history and problem list.    Review of Systems   Constitutional:  Positive for fatigue. Negative for unexpected weight gain and unexpected weight loss.   Eyes: Negative.    Cardiovascular:  Positive for palpitations.   Endocrine: Positive for heat intolerance.   Neurological:  Positive for dizziness and memory problem. Negative for tremors.   Psychiatric/Behavioral:  Positive for sleep disturbance.    All other systems reviewed and are negative.    /84 (BP Location: Right arm, Patient Position: Sitting, Cuff Size: Adult)   Pulse 108   Ht 162.6 cm (64\")   Wt 86.8 kg (191 lb 6.4 oz)   SpO2 94%   BMI 32.85 kg/m²      Physical Exam  Vitals reviewed.   Constitutional:     "   Appearance: Normal appearance.   Eyes:      Extraocular Movements: Extraocular movements intact.   Neck:      Thyroid: Thyromegaly and thyroid tenderness present. No thyroid mass.   Cardiovascular:      Rate and Rhythm: Tachycardia present.   Pulmonary:      Effort: Pulmonary effort is normal.   Skin:     General: Skin is warm.   Neurological:      General: No focal deficit present.      Mental Status: She is alert and oriented to person, place, and time.      Comments: No tremor noted.   Psychiatric:         Mood and Affect: Mood normal.         Behavior: Behavior normal.         Thought Content: Thought content normal.         Judgment: Judgment normal.      CMP  Lab Results   Component Value Date    GLUCOSE 96 01/11/2023    BUN 13 01/11/2023    CREATININE 0.53 (L) 01/11/2023    BCR 24.5 01/11/2023    K 4.3 01/11/2023    CO2 27.1 01/11/2023    CALCIUM 9.8 01/11/2023    ALBUMIN 4.4 01/11/2023    AST 26 01/11/2023    ALT 51 (H) 01/11/2023        CBC w/DIFF   Lab Results   Component Value Date    WBC 13.10 (H) 09/04/2022    RBC 3.05 (L) 09/04/2022    HGB 9.2 (L) 09/04/2022    HCT 28.1 (L) 09/04/2022    MCV 92 09/04/2022    MCH 30.2 09/04/2022    MCHC 32.7 09/04/2022    RDW 14.7 (H) 09/04/2022    MPV 10.5 09/04/2022     09/04/2022    NRBC 0.0 09/04/2022       TSH  Lab Results   Component Value Date    TSH <0.005 (L) 01/11/2023    TSH 0.010 (L) 06/27/2022    TSH 0.109 (L) 05/24/2022       T4  Lab Results   Component Value Date    FREET4 5.43 (H) 01/11/2023    FREET4 0.7 (L) 09/02/2022    FREET4 1.20 06/27/2022     No results found for: R4OSJEX    T3  Lab Results   Component Value Date    T3FREE 16.60 (H) 01/11/2023    T3FREE 3.93 06/27/2022    T3FREE 2.87 05/24/2022     Lab Results   Component Value Date    T5QUHBF 133 09/02/2022       TRAb  Lab Results   Component Value Date    TSURCPAB 4.54 (H) 03/28/2022       TPO  Lab Results   Component Value Date    THYROIDAB 38 (H) 03/28/2022       Assessment and  Plan    Diagnoses and all orders for this visit:    1. Hyperthyroidism (Primary)  Assessment & Plan:  -Clinically hyperthyroid.  -TFT's today with medication initiation as indicated. Discussed importance of taking meds regularly and not missing doses of medication.  -Follow-up in 6 months or sooner if indicated.  -Will obtain US Thyroid as well.    Orders:  -     T4, Free  -     TSH  -     T3, Free  -     Comprehensive Metabolic Panel  -     US Thyroid         Return in about 2 months (around 8/7/2023) for Follow-up appointment. The patient was instructed to contact the clinic with any interval questions or concerns.    This document has been electronically signed by PADMINI Walker  June 7, 2023 15:43 EDT  Endocrinology

## 2023-06-07 NOTE — ASSESSMENT & PLAN NOTE
-Clinically hyperthyroid.  -TFT's today with medication initiation as indicated. Discussed importance of taking meds regularly and not missing doses of medication.  -Follow-up in 6 months or sooner if indicated.  -Will obtain US Thyroid as well.

## 2023-06-08 LAB — T3FREE SERPL-MCNC: 6.21 PG/ML (ref 2–4.4)

## 2023-06-08 RX ORDER — PROPYLTHIOURACIL 50 MG/1
TABLET ORAL
Qty: 60 TABLET | Refills: 0 | Status: SHIPPED | OUTPATIENT
Start: 2023-06-08

## 2023-06-08 RX ORDER — PROPYLTHIOURACIL 50 MG/1
50 TABLET ORAL 2 TIMES DAILY
Qty: 60 TABLET | Refills: 2 | Status: SHIPPED | OUTPATIENT
Start: 2023-06-08 | End: 2023-06-08

## 2023-10-17 ENCOUNTER — TELEPHONE (OUTPATIENT)
Dept: ENDOCRINOLOGY | Facility: CLINIC | Age: 28
End: 2023-10-17

## 2023-10-17 NOTE — TELEPHONE ENCOUNTER
Caller: Albina Aquino    Relationship to patient: Self    Best call back number: [phone] (home)       Patient is needing: PT WOULD LIKE TO SEE IF SHE CAN BE WORKED IN ON NOV 3RD, IT IS THE SAME DAY AS HER US

## 2023-11-03 ENCOUNTER — OFFICE VISIT (OUTPATIENT)
Dept: ENDOCRINOLOGY | Facility: CLINIC | Age: 28
End: 2023-11-03
Payer: COMMERCIAL

## 2023-11-03 ENCOUNTER — HOSPITAL ENCOUNTER (OUTPATIENT)
Dept: ULTRASOUND IMAGING | Facility: HOSPITAL | Age: 28
Discharge: HOME | End: 2023-11-03
Admitting: NURSE PRACTITIONER
Payer: COMMERCIAL

## 2023-11-03 VITALS
BODY MASS INDEX: 32.61 KG/M2 | OXYGEN SATURATION: 99 % | DIASTOLIC BLOOD PRESSURE: 80 MMHG | SYSTOLIC BLOOD PRESSURE: 140 MMHG | HEIGHT: 64 IN | WEIGHT: 191 LBS | HEART RATE: 101 BPM

## 2023-11-03 DIAGNOSIS — E05.90 HYPERTHYROIDISM: Primary | ICD-10-CM

## 2023-11-03 PROCEDURE — 99214 OFFICE O/P EST MOD 30 MIN: CPT | Performed by: NURSE PRACTITIONER

## 2023-11-03 PROCEDURE — 1159F MED LIST DOCD IN RCRD: CPT | Performed by: NURSE PRACTITIONER

## 2023-11-03 PROCEDURE — 1160F RVW MEDS BY RX/DR IN RCRD: CPT | Performed by: NURSE PRACTITIONER

## 2023-11-03 PROCEDURE — 76536 US EXAM OF HEAD AND NECK: CPT

## 2023-11-03 RX ORDER — PROPYLTHIOURACIL 50 MG/1
100 TABLET ORAL 2 TIMES DAILY
Qty: 120 TABLET | Refills: 2 | Status: SHIPPED | OUTPATIENT
Start: 2023-11-03

## 2023-11-03 NOTE — PROGRESS NOTES
Chief Complaint   Patient presents with    Thyroid Problem        HPI   Albina Aquino is a 28 y.o. female had concerns including Thyroid Problem.    Hyperthyroidism.    She reports that she is taking PTU 50 mg BID.  She was without her medication for about a week.  She has only been back on it again.  She has been taking this regularly without any issues and without missing any doses.  She denies any changes today.  She and her  are considering having another child.     History:  She was noted to have hyperthyroidism during her pregnancy.  She was placed on PTU for some time.  This was stopped during her pregnancy due to her Free T4 being in range with a minimal low TSH.    She had her baby in August at .  She was having pruritis during pregnancy that has now resolved since having her baby.  She has not been on any thyroid medication since delivering in August. She is having increased forgetfulness and dizziness as well.    Denies any compressive s/sx's. She has not had any labs since her delivery.  She delivered at 31 weeks at  due to HELPP syndrome and pre-eclampsia.   She is having symptoms that she feels are related to hyperthyroidism.  Patient is currently breastfeeding.    The following portions of the patient's history were reviewed and updated as appropriate: allergies, current medications, past family history, past medical history, past social history, past surgical history and problem list.    Review of Systems   Constitutional:  Positive for fatigue. Negative for unexpected weight gain and unexpected weight loss.   Eyes: Negative.    Cardiovascular:  Positive for palpitations.   Endocrine: Positive for heat intolerance.   Neurological:  Positive for dizziness and memory problem. Negative for tremors.   Psychiatric/Behavioral:  Positive for sleep disturbance.    All other systems reviewed and are negative.    /80 (BP Location: Right arm, Patient Position: Sitting, Cuff Size: Adult)    "Pulse 101   Ht 162.6 cm (64\")   Wt 86.6 kg (191 lb)   SpO2 99%   BMI 32.79 kg/m²      Physical Exam  Vitals reviewed.   Constitutional:       Appearance: Normal appearance.   Eyes:      Extraocular Movements: Extraocular movements intact.   Neck:      Thyroid: Thyromegaly and thyroid tenderness present. No thyroid mass.   Cardiovascular:      Rate and Rhythm: Tachycardia present.   Pulmonary:      Effort: Pulmonary effort is normal.   Skin:     General: Skin is warm.   Neurological:      General: No focal deficit present.      Mental Status: She is alert and oriented to person, place, and time.      Comments: No tremor noted.   Psychiatric:         Mood and Affect: Mood normal.         Behavior: Behavior normal.         Thought Content: Thought content normal.         Judgment: Judgment normal.        CMP  Lab Results   Component Value Date    GLUCOSE 94 06/07/2023    BUN 9 06/07/2023    CREATININE 0.56 (L) 06/07/2023    BCR 16.1 06/07/2023    K 4.0 06/07/2023    CO2 26.4 06/07/2023    CALCIUM 9.4 06/07/2023    ALBUMIN 4.2 06/07/2023    AST 16 06/07/2023    ALT 22 06/07/2023        CBC w/DIFF   Lab Results   Component Value Date    WBC 13.10 (H) 09/04/2022    RBC 3.05 (L) 09/04/2022    HGB 9.2 (L) 09/04/2022    HCT 28.1 (L) 09/04/2022    MCV 92 09/04/2022    MCH 30.2 09/04/2022    MCHC 32.7 09/04/2022    RDW 14.7 (H) 09/04/2022    MPV 10.5 09/04/2022     09/04/2022    NRBC 0.0 09/04/2022       TSH  Lab Results   Component Value Date    TSH <0.005 (L) 06/07/2023    TSH <0.005 (L) 01/11/2023    TSH 0.010 (L) 06/27/2022       T4  Lab Results   Component Value Date    FREET4 1.91 (H) 06/07/2023    FREET4 5.43 (H) 01/11/2023    FREET4 0.7 (L) 09/02/2022     No results found for: \"Y5YQSGG\"    T3  Lab Results   Component Value Date    T3FREE 6.21 (H) 06/07/2023    T3FREE 16.60 (H) 01/11/2023    T3FREE 3.93 06/27/2022     Lab Results   Component Value Date    P8EBDZR 133 09/02/2022       TRAb  Lab Results "   Component Value Date    TSURCPAB 4.54 (H) 03/28/2022       TPO  Lab Results   Component Value Date    THYROIDAB 38 (H) 03/28/2022       Assessment and Plan    Diagnoses and all orders for this visit:    1. Hyperthyroidism (Primary)  Assessment & Plan:  -Clinically hyperthyroid.  -Due to patient only being back on her medication for a short period of time, she will take this consistently for 4 weeks and then have labs drawn to determine if medication adjustments are indicated.  -Discussed importance of taking meds regularly and not missing doses of medication.  -Follow-up in 2 months.  -Discussed with patient the importance of getting her TFT's in range with strict medication usage prior to getting pregnant.  Discussed the risks of pregnancy with uncontrolled hyperthyroidism with patient.    Orders:  -     TSH; Future  -     T4, free; Future    Other orders  -     propylthiouracil (PTU) 50 MG tablet; Take 2 tablets by mouth 2 (Two) Times a Day.  Dispense: 120 tablet; Refill: 2           Return in about 2 months (around 1/3/2024) for Follow-up appointment. The patient was instructed to contact the clinic with any interval questions or concerns.    This document has been electronically signed by PADMINI Walker  November 6, 2023 10:03 EST  Endocrinology

## 2023-11-06 NOTE — ASSESSMENT & PLAN NOTE
-Clinically hyperthyroid.  -Due to patient only being back on her medication for a short period of time, she will take this consistently for 4 weeks and then have labs drawn to determine if medication adjustments are indicated.  -Discussed importance of taking meds regularly and not missing doses of medication.  -Follow-up in 2 months.  -Discussed with patient the importance of getting her TFT's in range with strict medication usage prior to getting pregnant.  Discussed the risks of pregnancy with uncontrolled hyperthyroidism with patient.